# Patient Record
Sex: FEMALE | URBAN - NONMETROPOLITAN AREA
[De-identification: names, ages, dates, MRNs, and addresses within clinical notes are randomized per-mention and may not be internally consistent; named-entity substitution may affect disease eponyms.]

---

## 2021-11-29 ENCOUNTER — OFFICE VISIT (OUTPATIENT)
Dept: URBAN - NONMETROPOLITAN AREA CLINIC 18 | Facility: CLINIC | Age: 36
End: 2021-11-29
Payer: COMMERCIAL

## 2021-11-29 DIAGNOSIS — H52.223 REGULAR ASTIGMATISM, BILATERAL: Primary | ICD-10-CM

## 2021-11-29 DIAGNOSIS — H53.149 PHOTOPHOBIA: ICD-10-CM

## 2021-11-29 PROCEDURE — 92004 COMPRE OPH EXAM NEW PT 1/>: CPT | Performed by: OPTOMETRIST

## 2021-11-29 ASSESSMENT — INTRAOCULAR PRESSURE
OS: 20
OD: 20

## 2021-11-29 ASSESSMENT — VISUAL ACUITY
OD: 20/20
OS: 20/20

## 2021-11-29 NOTE — IMPRESSION/PLAN
Impression: Photophobia: H53.149. Plan: Ed pt there is no cure for her light sensitivity. Advised her to ask optical about driving tints to reduce glare.